# Patient Record
Sex: MALE | Race: WHITE | Employment: FULL TIME
[De-identification: names, ages, dates, MRNs, and addresses within clinical notes are randomized per-mention and may not be internally consistent; named-entity substitution may affect disease eponyms.]

---

## 2024-10-27 ENCOUNTER — HOSPITAL ENCOUNTER (EMERGENCY)
Facility: HOSPITAL | Age: 39
Discharge: HOME | End: 2024-10-27
Attending: EMERGENCY MEDICINE
Payer: COMMERCIAL

## 2024-10-27 VITALS
SYSTOLIC BLOOD PRESSURE: 116 MMHG | WEIGHT: 238.98 LBS | OXYGEN SATURATION: 95 % | HEART RATE: 64 BPM | TEMPERATURE: 98.4 F | RESPIRATION RATE: 16 BRPM | DIASTOLIC BLOOD PRESSURE: 81 MMHG

## 2024-10-27 DIAGNOSIS — L08.9 INFECTED SEBACEOUS CYST OF SKIN: Primary | ICD-10-CM

## 2024-10-27 DIAGNOSIS — L72.3 INFECTED SEBACEOUS CYST OF SKIN: Primary | ICD-10-CM

## 2024-10-27 DIAGNOSIS — L03.119 CELLULITIS AND ABSCESS OF LEG: ICD-10-CM

## 2024-10-27 DIAGNOSIS — L02.419 CELLULITIS AND ABSCESS OF LEG: ICD-10-CM

## 2024-10-27 PROCEDURE — 10060 I&D ABSCESS SIMPLE/SINGLE: CPT

## 2024-10-27 PROCEDURE — 99282 EMERGENCY DEPT VISIT SF MDM: CPT

## 2024-10-27 RX ORDER — DOXYCYCLINE HYCLATE 20 MG
100 TABLET ORAL 2 TIMES DAILY
COMMUNITY

## 2024-10-27 ASSESSMENT — PAIN - FUNCTIONAL ASSESSMENT: PAIN_FUNCTIONAL_ASSESSMENT: NONE - DENIES PAIN

## 2024-10-27 NOTE — ED NOTES
Pt's wound dressed with nonstick dressing and padded with guaze.  Taped with medipore tape.  Pt educated on dressing changes and wound care.  Pt verbalizes understanding and able to redemonstrate

## 2024-10-27 NOTE — ED TRIAGE NOTES
Pt has abscess/ cyst on left inner upper thigh.  Seen by pcp yesterday and placed on doxy.  States it is starting to have red streaks that weren't there before.

## 2024-10-27 NOTE — DISCHARGE INSTRUCTIONS
Clean the area daily.  Change bandage daily.  Continue antibiotic treatment with doxycycline twice a day for 7 days total

## 2024-10-27 NOTE — ED PROVIDER NOTES
[facility]  EMERGENCY DEPARTMENT ENCOUNTER      Pt Name: Lavelle Ocampo  MRN: [id number]  Birthdate 1985  Date of evaluation: 10/27/2024  Provider: Giovani Martinez MD    CHIEF COMPLAINT       Chief Complaint   Patient presents with    Abscess       EMERGENCY DEPARTMENT COURSE and DIFFERENTIAL DIAGNOSIS/MDM:   Medical Decision Making  39-year-old male presenting ER with abscess on his left upper thigh.  Seen by his PCP yesterday and placed on doxycycline.  Patient became concerned because there seem to be some spreading of redness around the cyst/abscess.  Denies any fevers or chills.  No significant pain.  Patient has only had 2 doses of the doxycycline.  No history of MRSA.  Denies any myalgias or nausea or vomiting or abdominal pain.  On exam patient has small 1 cm area of fluctuance with mild surrounding erythema.  Concern for abscess likely due to folliculitis.  In light of fluctuance and mild spreading of redness performed I&D.  Small amount of purulent material drained however seems to be have sebaceous cyst with some sebaceous material also removed.  Likely infected and sebaceous cyst.  Unable to remove cyst pocket with I&D.  I advised continued use of his antibiotic regiment.  Changing bandage daily and discussed wound care.  No signs or symptoms concerning for significant worsening cellulitis with only 2 cm of erythema surrounding the abscess/infected cyst.  Patient stable for discharge            REASSESSMENT          HISTORY OF PRESENT ILLNESS    Pt has abscess/ cyst on left inner upper thigh.  Seen by pcp yesterday and placed on doxy.  States it is starting to have red streaks that weren't there before.    39-year-old male presenting ER with abscess on his left upper thigh.          Nursing Notes were reviewed.    REVIEW OF SYSTEMS       Review of Systems      PAST MEDICAL HISTORY     Past Medical History:   Diagnosis Date    High cholesterol          SURGICAL HISTORY     No past surgical